# Patient Record
Sex: MALE | Employment: UNEMPLOYED | URBAN - METROPOLITAN AREA
[De-identification: names, ages, dates, MRNs, and addresses within clinical notes are randomized per-mention and may not be internally consistent; named-entity substitution may affect disease eponyms.]

---

## 2017-06-08 ENCOUNTER — PATIENT OUTREACH (OUTPATIENT)
Dept: FAMILY MEDICINE CLINIC | Facility: CLINIC | Age: 22
End: 2017-06-08

## 2017-06-20 ENCOUNTER — OFFICE VISIT (OUTPATIENT)
Dept: FAMILY MEDICINE CLINIC | Facility: CLINIC | Age: 22
End: 2017-06-20

## 2017-06-20 VITALS
RESPIRATION RATE: 12 BRPM | HEIGHT: 70.25 IN | BODY MASS INDEX: 32.78 KG/M2 | TEMPERATURE: 98 F | OXYGEN SATURATION: 99 % | SYSTOLIC BLOOD PRESSURE: 112 MMHG | WEIGHT: 229 LBS | DIASTOLIC BLOOD PRESSURE: 68 MMHG | HEART RATE: 70 BPM

## 2017-06-20 DIAGNOSIS — Z00.00 ROUTINE ADULT HEALTH MAINTENANCE: Primary | ICD-10-CM

## 2017-06-20 PROCEDURE — 99395 PREV VISIT EST AGE 18-39: CPT | Performed by: FAMILY MEDICINE

## 2017-06-20 NOTE — PROGRESS NOTES
Jose Singer is a 22year old male who presents for a complete physical exam.   HPI:   Pt complains of nothing.   Urination changes no  ED symptoms no  Immunizations needed no  Wt Readings from Last 6 Encounters:  06/20/17 : 229 lb  10/24/16 : 255 lb  03/0 allergic rhinitis/asthma    EXAM:   /68 mmHg  Pulse 70  Temp(Src) 98.1 °F (36.7 °C) (Oral)  Resp 12  Ht 70.25\"  Wt 229 lb  BMI 32.64 kg/m2  SpO2 99%  Body mass index is 32.64 kg/(m^2).    GENERAL: NAD, pleasant, well developed, normal voice  SKIN: no or prostate cancer, patient instructed to get colon cancer screening and prostate cancer screening done which were discussed in detail. Pt educated on immunizations appropriate for age.      The patient verbalizes understanding of these recommendations

## 2017-07-20 NOTE — ADDENDUM NOTE
Encounter addended by: Maddi Castrejon on: 7/20/2017 10:57 AM<BR>    Actions taken: Letter status changed

## 2017-11-06 ENCOUNTER — OFFICE VISIT (OUTPATIENT)
Dept: FAMILY MEDICINE CLINIC | Facility: CLINIC | Age: 22
End: 2017-11-06

## 2017-11-06 ENCOUNTER — HOSPITAL ENCOUNTER (OUTPATIENT)
Dept: GENERAL RADIOLOGY | Age: 22
Discharge: HOME | End: 2017-11-06
Attending: FAMILY MEDICINE
Payer: COMMERCIAL

## 2017-11-06 VITALS
OXYGEN SATURATION: 100 % | RESPIRATION RATE: 12 BRPM | DIASTOLIC BLOOD PRESSURE: 62 MMHG | SYSTOLIC BLOOD PRESSURE: 114 MMHG | WEIGHT: 226 LBS | HEIGHT: 71 IN | BODY MASS INDEX: 31.64 KG/M2 | TEMPERATURE: 98 F | HEART RATE: 60 BPM

## 2017-11-06 DIAGNOSIS — M25.511 RIGHT SHOULDER PAIN, UNSPECIFIED CHRONICITY: ICD-10-CM

## 2017-11-06 DIAGNOSIS — M54.50 BILATERAL LOW BACK PAIN WITHOUT SCIATICA, UNSPECIFIED CHRONICITY: ICD-10-CM

## 2017-11-06 DIAGNOSIS — M54.50 BILATERAL LOW BACK PAIN WITHOUT SCIATICA, UNSPECIFIED CHRONICITY: Primary | ICD-10-CM

## 2017-11-06 PROCEDURE — 72110 X-RAY EXAM L-2 SPINE 4/>VWS: CPT | Performed by: FAMILY MEDICINE

## 2017-11-06 PROCEDURE — 99214 OFFICE O/P EST MOD 30 MIN: CPT | Performed by: FAMILY MEDICINE

## 2017-11-06 NOTE — PROGRESS NOTES
HPI:    Patient ID: Yuni Ramsey is a 25year old male. HPI  Patient is here with complaint of right shoulder pain after he was lifting up 1 of his friends and carry him downstairs because he was drunk.   The next day he felt pain in the right shoulder and stretching as needed. I would do the exercise for 1 week and follow-up with me in 7-10 days. Hold off on gym workout at this time. He does do resistance exercise in the gym. No orders of the defined types were placed in this encounter.       Meds Th

## 2017-11-07 ENCOUNTER — TELEPHONE (OUTPATIENT)
Dept: FAMILY MEDICINE CLINIC | Facility: CLINIC | Age: 22
End: 2017-11-07

## 2017-11-07 NOTE — TELEPHONE ENCOUNTER
Pt called back. Spoke with patient regarding xray results in detail.  Pt scheduled a follow up appt with dr. Reynaldo Lin  Date Time Provider Tamia Yolanda   11/14/2017 4:45 PM Kassidy Yanes MD [facility] [address]

## 2017-11-14 ENCOUNTER — OFFICE VISIT (OUTPATIENT)
Dept: FAMILY MEDICINE CLINIC | Facility: CLINIC | Age: 22
End: 2017-11-14

## 2017-11-14 VITALS
HEIGHT: 71 IN | HEART RATE: 61 BPM | RESPIRATION RATE: 12 BRPM | TEMPERATURE: 98 F | SYSTOLIC BLOOD PRESSURE: 118 MMHG | BODY MASS INDEX: 31.64 KG/M2 | OXYGEN SATURATION: 100 % | WEIGHT: 226 LBS | DIASTOLIC BLOOD PRESSURE: 78 MMHG

## 2017-11-14 DIAGNOSIS — M54.50 BILATERAL LOW BACK PAIN WITHOUT SCIATICA, UNSPECIFIED CHRONICITY: Primary | ICD-10-CM

## 2017-11-14 DIAGNOSIS — M25.511 RIGHT SHOULDER PAIN, UNSPECIFIED CHRONICITY: ICD-10-CM

## 2017-11-14 PROCEDURE — 99214 OFFICE O/P EST MOD 30 MIN: CPT | Performed by: FAMILY MEDICINE

## 2017-11-14 RX ORDER — PREDNISONE 20 MG/1
20 TABLET ORAL 2 TIMES DAILY
Qty: 10 TABLET | Refills: 0 | Status: SHIPPED | OUTPATIENT
Start: 2017-11-14 | End: 2017-11-19

## 2017-11-16 NOTE — PROGRESS NOTES
HPI:    Patient ID: Handy Ruffin is a 25year old male. HPI  Patient is here for follow-up of low back pain and shoulder pain. The low back pain is gone. Shoulder pain persists.   Review of Systems  Negative except for the above       Current Outpa

## 2017-12-12 ENCOUNTER — OFFICE VISIT (OUTPATIENT)
Dept: FAMILY MEDICINE CLINIC | Facility: CLINIC | Age: 22
End: 2017-12-12

## 2017-12-12 VITALS
RESPIRATION RATE: 12 BRPM | DIASTOLIC BLOOD PRESSURE: 68 MMHG | SYSTOLIC BLOOD PRESSURE: 118 MMHG | HEART RATE: 55 BPM | TEMPERATURE: 98 F | BODY MASS INDEX: 32 KG/M2 | WEIGHT: 229 LBS | OXYGEN SATURATION: 100 %

## 2017-12-12 DIAGNOSIS — M54.50 BILATERAL LOW BACK PAIN WITHOUT SCIATICA, UNSPECIFIED CHRONICITY: Primary | ICD-10-CM

## 2017-12-12 PROCEDURE — 99214 OFFICE O/P EST MOD 30 MIN: CPT | Performed by: FAMILY MEDICINE

## 2017-12-12 RX ORDER — PREDNISONE 20 MG/1
20 TABLET ORAL 2 TIMES DAILY
Qty: 10 TABLET | Refills: 0 | Status: SHIPPED | OUTPATIENT
Start: 2017-12-12 | End: 2017-12-17

## 2017-12-12 NOTE — PROGRESS NOTES
HPI:    Patient ID: Soco Gordon is a 25year old male. HPI  Patient is here with complaint of low back pain over the past several weeks or more. No radiation no bowel or bladder symptoms. Over the last several days it has gotten somewhat worse.

## 2017-12-18 ENCOUNTER — APPOINTMENT (OUTPATIENT)
Dept: PHYSICAL THERAPY | Facility: HOSPITAL | Age: 22
End: 2017-12-18
Attending: FAMILY MEDICINE
Payer: COMMERCIAL

## 2017-12-21 ENCOUNTER — HOSPITAL ENCOUNTER (OUTPATIENT)
Dept: PHYSICAL THERAPY | Facility: HOSPITAL | Age: 22
Setting detail: THERAPIES SERIES
Discharge: HOME | End: 2017-12-21
Attending: FAMILY MEDICINE
Payer: COMMERCIAL

## 2017-12-21 PROCEDURE — 97161 PT EVAL LOW COMPLEX 20 MIN: CPT

## 2017-12-28 ENCOUNTER — APPOINTMENT (OUTPATIENT)
Dept: PHYSICAL THERAPY | Facility: HOSPITAL | Age: 22
End: 2017-12-28
Attending: FAMILY MEDICINE
Payer: COMMERCIAL

## 2018-01-04 ENCOUNTER — APPOINTMENT (OUTPATIENT)
Dept: PHYSICAL THERAPY | Facility: HOSPITAL | Age: 23
End: 2018-01-04
Attending: FAMILY MEDICINE
Payer: COMMERCIAL

## 2018-01-08 ENCOUNTER — TELEPHONE (OUTPATIENT)
Dept: FAMILY MEDICINE CLINIC | Facility: CLINIC | Age: 23
End: 2018-01-08

## 2018-01-08 ENCOUNTER — APPOINTMENT (OUTPATIENT)
Dept: PHYSICAL THERAPY | Facility: HOSPITAL | Age: 23
End: 2018-01-08
Attending: FAMILY MEDICINE
Payer: COMMERCIAL

## 2018-01-08 DIAGNOSIS — M54.50 BILATERAL LOW BACK PAIN WITHOUT SCIATICA, UNSPECIFIED CHRONICITY: Primary | ICD-10-CM

## 2018-01-08 NOTE — TELEPHONE ENCOUNTER
Fredy Nichols  [facility]   Cc: [facility] Referral Pool   Phone Number: [phone]             .Reason for the order/referral:Back Pain   PCP: Michelle Medina   Refer to Provider: Tobin Cooper   Specialty:Physical Therapy   Patient Insurance: Pa

## 2018-01-15 ENCOUNTER — APPOINTMENT (OUTPATIENT)
Dept: PHYSICAL THERAPY | Facility: HOSPITAL | Age: 23
End: 2018-01-15
Attending: FAMILY MEDICINE
Payer: COMMERCIAL

## 2018-06-22 ENCOUNTER — OFFICE VISIT (OUTPATIENT)
Dept: FAMILY MEDICINE CLINIC | Facility: CLINIC | Age: 23
End: 2018-06-22

## 2018-06-22 VITALS
WEIGHT: 242 LBS | BODY MASS INDEX: 34 KG/M2 | OXYGEN SATURATION: 100 % | HEART RATE: 75 BPM | RESPIRATION RATE: 12 BRPM | TEMPERATURE: 98 F

## 2018-06-22 DIAGNOSIS — M25.562 ACUTE PAIN OF LEFT KNEE: Primary | ICD-10-CM

## 2018-06-22 DIAGNOSIS — Z77.21 EXPOSURE TO POTENTIALLY HAZARDOUS BODY FLUIDS: ICD-10-CM

## 2018-06-22 DIAGNOSIS — Z00.00 ROUTINE ADULT HEALTH MAINTENANCE: ICD-10-CM

## 2018-06-22 PROBLEM — M54.50 BILATERAL LOW BACK PAIN WITHOUT SCIATICA: Status: RESOLVED | Noted: 2017-11-06 | Resolved: 2018-06-22

## 2018-06-22 PROBLEM — M25.511 RIGHT SHOULDER PAIN: Status: RESOLVED | Noted: 2017-11-06 | Resolved: 2018-06-22

## 2018-06-22 PROCEDURE — 99213 OFFICE O/P EST LOW 20 MIN: CPT | Performed by: FAMILY MEDICINE

## 2018-06-22 RX ORDER — PREDNISONE 20 MG/1
20 TABLET ORAL 2 TIMES DAILY
Qty: 10 TABLET | Refills: 0 | Status: SHIPPED | OUTPATIENT
Start: 2018-06-22 | End: 2018-06-27

## 2018-06-22 NOTE — PROGRESS NOTES
HPI:    Patient ID: Nick Hill is a 25year old male. HPI  6-8 wks, left knee pain, plays volleyball . Not recently too much.   Patient also states she had unprotected intercourse yesterday with a girl and she got checked for STDs and was told sh

## 2018-06-28 ENCOUNTER — LAB ENCOUNTER (OUTPATIENT)
Dept: LAB | Age: 23
End: 2018-06-28
Attending: FAMILY MEDICINE
Payer: COMMERCIAL

## 2018-06-28 DIAGNOSIS — Z77.21 EXPOSURE TO POTENTIALLY HAZARDOUS BODY FLUIDS: ICD-10-CM

## 2018-06-28 DIAGNOSIS — Z00.00 ROUTINE ADULT HEALTH MAINTENANCE: ICD-10-CM

## 2018-06-28 PROCEDURE — 85027 COMPLETE CBC AUTOMATED: CPT | Performed by: FAMILY MEDICINE

## 2018-06-28 PROCEDURE — 80053 COMPREHEN METABOLIC PANEL: CPT | Performed by: FAMILY MEDICINE

## 2018-06-28 PROCEDURE — 87389 HIV-1 AG W/HIV-1&-2 AB AG IA: CPT | Performed by: FAMILY MEDICINE

## 2018-06-28 PROCEDURE — 87591 N.GONORRHOEAE DNA AMP PROB: CPT | Performed by: FAMILY MEDICINE

## 2018-06-28 PROCEDURE — 86694 HERPES SIMPLEX NES ANTBDY: CPT | Performed by: FAMILY MEDICINE

## 2018-06-28 PROCEDURE — 36415 COLL VENOUS BLD VENIPUNCTURE: CPT | Performed by: FAMILY MEDICINE

## 2018-06-28 PROCEDURE — 87491 CHLMYD TRACH DNA AMP PROBE: CPT | Performed by: FAMILY MEDICINE

## 2018-06-28 PROCEDURE — 80061 LIPID PANEL: CPT | Performed by: FAMILY MEDICINE

## 2018-06-28 PROCEDURE — 86592 SYPHILIS TEST NON-TREP QUAL: CPT | Performed by: FAMILY MEDICINE

## 2018-07-01 LAB — RPR SER QL: NONREACTIVE

## 2019-11-13 ENCOUNTER — OFFICE VISIT (OUTPATIENT)
Dept: FAMILY MEDICINE CLINIC | Facility: CLINIC | Age: 24
End: 2019-11-13
Payer: COMMERCIAL

## 2019-11-13 ENCOUNTER — LAB ENCOUNTER (OUTPATIENT)
Dept: LAB | Age: 24
End: 2019-11-13
Attending: FAMILY MEDICINE
Payer: COMMERCIAL

## 2019-11-13 VITALS
WEIGHT: 216 LBS | OXYGEN SATURATION: 98 % | BODY MASS INDEX: 30.24 KG/M2 | HEIGHT: 71 IN | RESPIRATION RATE: 16 BRPM | SYSTOLIC BLOOD PRESSURE: 118 MMHG | TEMPERATURE: 99 F | HEART RATE: 69 BPM | DIASTOLIC BLOOD PRESSURE: 80 MMHG

## 2019-11-13 DIAGNOSIS — Z00.00 ROUTINE GENERAL MEDICAL EXAMINATION AT A HEALTH CARE FACILITY: ICD-10-CM

## 2019-11-13 DIAGNOSIS — Z77.21 EXPOSURE TO POTENTIALLY HAZARDOUS BODY FLUIDS: ICD-10-CM

## 2019-11-13 DIAGNOSIS — Z00.00 ROUTINE GENERAL MEDICAL EXAMINATION AT A HEALTH CARE FACILITY: Primary | ICD-10-CM

## 2019-11-13 PROCEDURE — 84439 ASSAY OF FREE THYROXINE: CPT

## 2019-11-13 PROCEDURE — 90686 IIV4 VACC NO PRSV 0.5 ML IM: CPT | Performed by: FAMILY MEDICINE

## 2019-11-13 PROCEDURE — 87591 N.GONORRHOEAE DNA AMP PROB: CPT

## 2019-11-13 PROCEDURE — 99395 PREV VISIT EST AGE 18-39: CPT | Performed by: FAMILY MEDICINE

## 2019-11-13 PROCEDURE — 86694 HERPES SIMPLEX NES ANTBDY: CPT

## 2019-11-13 PROCEDURE — 36415 COLL VENOUS BLD VENIPUNCTURE: CPT

## 2019-11-13 PROCEDURE — 80053 COMPREHEN METABOLIC PANEL: CPT

## 2019-11-13 PROCEDURE — 86592 SYPHILIS TEST NON-TREP QUAL: CPT

## 2019-11-13 PROCEDURE — 84443 ASSAY THYROID STIM HORMONE: CPT

## 2019-11-13 PROCEDURE — 87491 CHLMYD TRACH DNA AMP PROBE: CPT

## 2019-11-13 PROCEDURE — 90471 IMMUNIZATION ADMIN: CPT | Performed by: FAMILY MEDICINE

## 2019-11-13 PROCEDURE — 87389 HIV-1 AG W/HIV-1&-2 AB AG IA: CPT

## 2019-11-13 PROCEDURE — 85025 COMPLETE CBC W/AUTO DIFF WBC: CPT

## 2019-11-13 PROCEDURE — 80061 LIPID PANEL: CPT

## 2019-11-17 NOTE — PROGRESS NOTES
Keyonna Alvares is a 22year old male who presents for a complete physical exam.   HPI:   Pt complains of nothing.   Urination changes no  ED symptoms no  Immunizations needed no  Wt Readings from Last 6 Encounters:  11/13/19 : 216 lb (98 kg)  06/22/18 : W/CONF   Result Value Ref Range    Rapid Plasma Reagin Nonreactive Nonreactive   CHLAMYDIA/GONOCOCCUS, KATARINA   Result Value Ref Range    Chlamydia Trachomatis Amplified RNA Negative Negative    Neisseria Gonorrhoeae Amplified RNA Negative Negative    Chlam/G GENERAL: NAD, pleasant, well developed, normal voice  SKIN: no rashes, no suspicious moles or lesions  HENT: NCAT, EACs clear b/l, TMs normal b/l, nasal turbinatess normal b/l, oropharynx with mmm/clear/normal, gingiva normal, lips normal  EYES: PERRLA, if family history of colon or prostate cancer, patient instructed to get colon cancer screening and prostate cancer screening done which were discussed in detail. Pt educated on immunizations appropriate for age.      The patient verbalizes understandin

## 2019-11-19 ENCOUNTER — OFFICE VISIT (OUTPATIENT)
Dept: FAMILY MEDICINE CLINIC | Facility: CLINIC | Age: 24
End: 2019-11-19
Payer: COMMERCIAL

## 2019-11-19 VITALS
BODY MASS INDEX: 31.31 KG/M2 | WEIGHT: 223.63 LBS | DIASTOLIC BLOOD PRESSURE: 70 MMHG | RESPIRATION RATE: 16 BRPM | HEIGHT: 71 IN | HEART RATE: 80 BPM | SYSTOLIC BLOOD PRESSURE: 107 MMHG | TEMPERATURE: 98 F

## 2019-11-19 DIAGNOSIS — A74.9 CHLAMYDIA: Primary | ICD-10-CM

## 2019-11-19 PROCEDURE — 99213 OFFICE O/P EST LOW 20 MIN: CPT | Performed by: FAMILY MEDICINE

## 2019-11-19 RX ORDER — AZITHROMYCIN 250 MG/1
250 TABLET, FILM COATED ORAL DAILY
Qty: 4 TABLET | Refills: 0 | Status: SHIPPED | OUTPATIENT
Start: 2019-11-19 | End: 2019-11-23

## 2019-11-19 NOTE — PROGRESS NOTES
HPI:   Macho Cabello is a 22year old male that presents for 11/13/19 lab results. The patient states he has had unprotected intercourse. He has never had chlamydia in the past. He is asymptomatic.  He was informed by a sexual partner he last had in cyanosis, 2+ radial pulses b/l. NEURO:  Grossly normal     ASSESSMENT AND PLAN:      Michelle was seen today for lab results.     Diagnoses and all orders for this visit:    Chlamydia  I counseled patient on chlamydia treatment and advise him on safe-sex prac

## 2019-11-20 ENCOUNTER — TELEPHONE (OUTPATIENT)
Dept: FAMILY MEDICINE CLINIC | Facility: CLINIC | Age: 24
End: 2019-11-20

## 2019-11-20 NOTE — TELEPHONE ENCOUNTER
Spoke to pharmacy, need clarification on Azithromycin script.  Rx sent in with 2 different directions \"take 1 tablet by mouth for 4 doses\" and \"take four tablets x 1 dose\"    Last OV 11/19/19  Chlamydia  I counseled patient on chlamydia treatment and ad

## 2019-11-20 NOTE — TELEPHONE ENCOUNTER
azithromycin 250 MG Oral Tab    There are 2 different directions. Please clarify for pharmacy.     [facility] [address], [phone], [phone]

## 2019-12-09 ENCOUNTER — TELEPHONE (OUTPATIENT)
Dept: FAMILY MEDICINE CLINIC | Facility: CLINIC | Age: 24
End: 2019-12-09

## 2019-12-09 NOTE — TELEPHONE ENCOUNTER
Calling to find out if patient was prescribed medication for his condition. Informed her that medication was prescribed and the date it was prescribed.

## 2020-08-11 NOTE — PROGRESS NOTES
- schedule zofran at his request  - will decrease to prn today  - c/w compazine and ativan as prns   SPINE EVALUATION:   Referring Physician: Dr. Herb Arevalo  Diagnosis: BL low back pain without sciatica     Date of Service: 12/21/2017     PATIENT SUMMARY   Toy Ramirez III is a 25year old y/o male who presents to therapy today with complaints of low franki pain at 40, to 20 cm ++pain (comparable sign)  Extension: 63 cm  Sidebending: R WF +painL; L WFL  Rotation: R WFL +pain; L WFL    Accessory motion: unremarkable  Palpation: non-tender to palpation    Strength:   LE   Hip flexion: R 5/5; L 5/5  Hip abductio PT    [de-identified] certification required: Yes  I certify the need for these services furnished under this plan of treatment and while under my care.     X___________________________________________________ Date____________________    Certification From: 80/

## 2021-10-18 ENCOUNTER — OFFICE VISIT (OUTPATIENT)
Dept: FAMILY MEDICINE CLINIC | Facility: CLINIC | Age: 26
End: 2021-10-18
Payer: COMMERCIAL

## 2021-10-18 VITALS
BODY MASS INDEX: 30.8 KG/M2 | TEMPERATURE: 97 F | HEART RATE: 88 BPM | RESPIRATION RATE: 16 BRPM | SYSTOLIC BLOOD PRESSURE: 118 MMHG | DIASTOLIC BLOOD PRESSURE: 70 MMHG | OXYGEN SATURATION: 99 % | WEIGHT: 220 LBS | HEIGHT: 71 IN

## 2021-10-18 DIAGNOSIS — Z00.00 ROUTINE ADULT HEALTH MAINTENANCE: Primary | ICD-10-CM

## 2021-10-18 DIAGNOSIS — Z11.3 SCREENING EXAMINATION FOR STD (SEXUALLY TRANSMITTED DISEASE): ICD-10-CM

## 2021-10-18 DIAGNOSIS — Z77.21 EXPOSURE TO POTENTIALLY HAZARDOUS BODY FLUIDS: ICD-10-CM

## 2021-10-18 PROCEDURE — 3078F DIAST BP <80 MM HG: CPT | Performed by: FAMILY MEDICINE

## 2021-10-18 PROCEDURE — 3074F SYST BP LT 130 MM HG: CPT | Performed by: FAMILY MEDICINE

## 2021-10-18 PROCEDURE — 3008F BODY MASS INDEX DOCD: CPT | Performed by: FAMILY MEDICINE

## 2021-10-18 PROCEDURE — 99395 PREV VISIT EST AGE 18-39: CPT | Performed by: FAMILY MEDICINE

## 2021-10-18 NOTE — PROGRESS NOTES
Radha Joseph is a 23year old male who presents for a complete physical exam.   HPI:   Pt complains of nothing.   Urination changes no  ED symptoms no  Immunizations needed no  Wt Readings from Last 6 Encounters:  10/18/21 : 220 lb (99.8 kg)  11/19/19 Non-Reactive Non-Reactive   HIV AG AB COMBO LAVENDER HOLD   Result Value Ref Range    HIV Ag Ab Hold Lavender Auto Resulted    CHLAMYDIA/GONOCOCCUS, KATARINA    Specimen: Urine;  Other   Result Value Ref Range    Chlamydia Trachomatis Amplified RNA Positive (A) respiratory symptoms  LUNGS: denies EVERARDO, wheezing, cough, orthopnea and PND  CARDIOVASCULAR: denies CP, palpitations, rapid or slow heart rate.  Denies DENISE/lower extremity swelling  GI: denies abdominal pain,denies heartburn, denies n/v/c/d/change in stools judgement and insight      Immunization History  Administered            Date(s) Administered    Covid-19 Vaccine Pfizer 30 mcg/0.3 ml                          04/05/2021 05/03/2021      FLULAVAL 6 months & older 0.5 ml Prefilled syringe ([id number])

## 2021-10-19 ENCOUNTER — LAB ENCOUNTER (OUTPATIENT)
Dept: LAB | Age: 26
End: 2021-10-19
Attending: FAMILY MEDICINE
Payer: COMMERCIAL

## 2021-10-19 DIAGNOSIS — Z00.00 ROUTINE ADULT HEALTH MAINTENANCE: ICD-10-CM

## 2021-10-19 DIAGNOSIS — Z11.3 SCREENING EXAMINATION FOR STD (SEXUALLY TRANSMITTED DISEASE): ICD-10-CM

## 2021-10-19 DIAGNOSIS — Z11.3 SCREENING EXAMINATION FOR VENEREAL DISEASE: Primary | ICD-10-CM

## 2021-10-19 PROCEDURE — 80050 GENERAL HEALTH PANEL: CPT | Performed by: FAMILY MEDICINE

## 2021-10-19 PROCEDURE — 84439 ASSAY OF FREE THYROXINE: CPT | Performed by: FAMILY MEDICINE

## 2021-10-19 PROCEDURE — 87591 N.GONORRHOEAE DNA AMP PROB: CPT | Performed by: FAMILY MEDICINE

## 2021-10-19 PROCEDURE — 86592 SYPHILIS TEST NON-TREP QUAL: CPT | Performed by: FAMILY MEDICINE

## 2021-10-19 PROCEDURE — 80061 LIPID PANEL: CPT | Performed by: FAMILY MEDICINE

## 2021-10-19 PROCEDURE — 87389 HIV-1 AG W/HIV-1&-2 AB AG IA: CPT | Performed by: FAMILY MEDICINE

## 2021-10-19 PROCEDURE — 87491 CHLMYD TRACH DNA AMP PROBE: CPT | Performed by: FAMILY MEDICINE

## 2021-10-19 PROCEDURE — 87529 HSV DNA AMP PROBE: CPT | Performed by: FAMILY MEDICINE

## 2021-10-25 ENCOUNTER — TELEPHONE (OUTPATIENT)
Dept: FAMILY MEDICINE CLINIC | Facility: CLINIC | Age: 26
End: 2021-10-25

## 2021-10-25 RX ORDER — AMOXICILLIN AND CLAVULANATE POTASSIUM 500; 125 MG/1; MG/1
1 TABLET, FILM COATED ORAL 2 TIMES DAILY
Qty: 20 TABLET | Refills: 0 | Status: SHIPPED | OUTPATIENT
Start: 2021-10-25 | End: 2021-11-04

## 2021-10-25 NOTE — TELEPHONE ENCOUNTER
Spoke with pt, states he was in the office on 10/18/21 and Dr. Delilah Galo stated his tonsils were somewhat swollen and would send in antibiotic. Pt states they seem to have gone down a little bit but does have a mild sore throat.  Were you going to send in anti

## 2021-10-25 NOTE — TELEPHONE ENCOUNTER
Patient calling, patient had visit on 10-18 looking for prescription to be sent to pharmacy.  Patient was to get antibiotic, please advise

## 2021-11-17 ENCOUNTER — VIRTUAL PHONE E/M (OUTPATIENT)
Dept: FAMILY MEDICINE CLINIC | Facility: CLINIC | Age: 26
End: 2021-11-17
Payer: COMMERCIAL

## 2021-11-17 DIAGNOSIS — J01.00 ACUTE NON-RECURRENT MAXILLARY SINUSITIS: Primary | ICD-10-CM

## 2021-11-17 DIAGNOSIS — J06.9 URI WITH COUGH AND CONGESTION: ICD-10-CM

## 2021-11-17 PROCEDURE — 99213 OFFICE O/P EST LOW 20 MIN: CPT | Performed by: FAMILY MEDICINE

## 2021-11-17 RX ORDER — AMOXICILLIN AND CLAVULANATE POTASSIUM 500; 125 MG/1; MG/1
1 TABLET, FILM COATED ORAL 2 TIMES DAILY
Qty: 20 TABLET | Refills: 0 | Status: SHIPPED | OUTPATIENT
Start: 2021-11-17 | End: 2021-11-27

## 2021-11-17 NOTE — PROGRESS NOTES
TELEMEDICINE VISIT by phone  This visit is conducted using Telemedicine with live, interactive video    Verification of patient identity was established by the  patient (s)  Dacia Morse verbally consents to a telemedicine Weight as of 10/18/21: 220 lb (99.8 kg). No Vital Signs due to telemedicine visit  Physical Exam:  GEN:  Patient is alert, awake and oriented, no apparent distress. ASSESSMENT AND PLAN:      1. Acute non-recurrent maxillary sinusitis    2.  URI with

## 2022-02-21 ENCOUNTER — HOSPITAL ENCOUNTER (OUTPATIENT)
Dept: GENERAL RADIOLOGY | Age: 27
Discharge: HOME | End: 2022-02-21
Attending: FAMILY MEDICINE
Payer: COMMERCIAL

## 2022-02-21 ENCOUNTER — OFFICE VISIT (OUTPATIENT)
Dept: FAMILY MEDICINE CLINIC | Facility: CLINIC | Age: 27
End: 2022-02-21
Payer: COMMERCIAL

## 2022-02-21 VITALS
BODY MASS INDEX: 31.24 KG/M2 | WEIGHT: 223.13 LBS | TEMPERATURE: 98 F | HEIGHT: 71 IN | OXYGEN SATURATION: 98 % | RESPIRATION RATE: 16 BRPM | SYSTOLIC BLOOD PRESSURE: 110 MMHG | HEART RATE: 56 BPM | DIASTOLIC BLOOD PRESSURE: 70 MMHG

## 2022-02-21 DIAGNOSIS — R10.84 GENERALIZED ABDOMINAL PAIN: ICD-10-CM

## 2022-02-21 DIAGNOSIS — R10.13 DYSPEPSIA: ICD-10-CM

## 2022-02-21 DIAGNOSIS — R10.84 GENERALIZED ABDOMINAL PAIN: Primary | ICD-10-CM

## 2022-02-21 DIAGNOSIS — R14.0 BLOATING: ICD-10-CM

## 2022-02-21 PROBLEM — Z77.21 EXPOSURE TO POTENTIALLY HAZARDOUS BODY FLUIDS: Status: RESOLVED | Noted: 2018-06-22 | Resolved: 2022-02-21

## 2022-02-21 PROBLEM — M25.562 ACUTE PAIN OF LEFT KNEE: Status: RESOLVED | Noted: 2018-06-22 | Resolved: 2022-02-21

## 2022-02-21 PROCEDURE — 3078F DIAST BP <80 MM HG: CPT | Performed by: FAMILY MEDICINE

## 2022-02-21 PROCEDURE — 74019 RADEX ABDOMEN 2 VIEWS: CPT | Performed by: FAMILY MEDICINE

## 2022-02-21 PROCEDURE — 3074F SYST BP LT 130 MM HG: CPT | Performed by: FAMILY MEDICINE

## 2022-02-21 PROCEDURE — 3008F BODY MASS INDEX DOCD: CPT | Performed by: FAMILY MEDICINE

## 2022-02-21 PROCEDURE — 99214 OFFICE O/P EST MOD 30 MIN: CPT | Performed by: FAMILY MEDICINE

## 2022-04-27 ENCOUNTER — VIRTUAL PHONE E/M (OUTPATIENT)
Dept: FAMILY MEDICINE CLINIC | Facility: CLINIC | Age: 27
End: 2022-04-27
Payer: COMMERCIAL

## 2022-04-27 DIAGNOSIS — S79.922A INJURY OF LEFT THIGH, INITIAL ENCOUNTER: Primary | ICD-10-CM

## 2022-04-27 PROCEDURE — 99443 PHONE E/M BY PHYS 21-30 MIN: CPT | Performed by: FAMILY MEDICINE

## 2022-09-12 ENCOUNTER — OFFICE VISIT (OUTPATIENT)
Dept: FAMILY MEDICINE CLINIC | Facility: CLINIC | Age: 27
End: 2022-09-12

## 2022-09-12 VITALS
RESPIRATION RATE: 16 BRPM | WEIGHT: 230 LBS | TEMPERATURE: 97 F | DIASTOLIC BLOOD PRESSURE: 70 MMHG | BODY MASS INDEX: 32.2 KG/M2 | HEART RATE: 56 BPM | OXYGEN SATURATION: 98 % | HEIGHT: 71 IN | SYSTOLIC BLOOD PRESSURE: 126 MMHG

## 2022-09-12 DIAGNOSIS — H93.232 HYPERACUSIS OF LEFT EAR: Primary | ICD-10-CM

## 2022-09-12 DIAGNOSIS — H66.90 ACUTE OTITIS MEDIA, UNSPECIFIED OTITIS MEDIA TYPE: ICD-10-CM

## 2022-09-12 PROCEDURE — 99213 OFFICE O/P EST LOW 20 MIN: CPT | Performed by: FAMILY MEDICINE

## 2022-09-12 RX ORDER — AZITHROMYCIN 250 MG/1
TABLET, FILM COATED ORAL
Qty: 6 TABLET | Refills: 0 | Status: SHIPPED | OUTPATIENT
Start: 2022-09-12 | End: 2022-09-17

## 2024-10-01 ENCOUNTER — MED REC SCAN ONLY (OUTPATIENT)
Dept: FAMILY MEDICINE CLINIC | Facility: CLINIC | Age: 29
End: 2024-10-01

## 2025-07-23 ENCOUNTER — OFFICE VISIT (OUTPATIENT)
Dept: FAMILY MEDICINE CLINIC | Facility: CLINIC | Age: 30
End: 2025-07-23
Payer: COMMERCIAL

## 2025-07-23 ENCOUNTER — LAB ENCOUNTER (OUTPATIENT)
Dept: LAB | Age: 30
End: 2025-07-23
Attending: FAMILY MEDICINE
Payer: COMMERCIAL

## 2025-07-23 VITALS
TEMPERATURE: 98 F | HEART RATE: 74 BPM | SYSTOLIC BLOOD PRESSURE: 114 MMHG | HEIGHT: 71 IN | BODY MASS INDEX: 31.92 KG/M2 | DIASTOLIC BLOOD PRESSURE: 70 MMHG | OXYGEN SATURATION: 98 % | WEIGHT: 228 LBS | RESPIRATION RATE: 16 BRPM

## 2025-07-23 DIAGNOSIS — K21.9 GASTROESOPHAGEAL REFLUX DISEASE, UNSPECIFIED WHETHER ESOPHAGITIS PRESENT: ICD-10-CM

## 2025-07-23 DIAGNOSIS — Z00.00 ROUTINE ADULT HEALTH MAINTENANCE: Primary | ICD-10-CM

## 2025-07-23 DIAGNOSIS — Z80.0 FAMILY HISTORY- STOMACH CANCER: ICD-10-CM

## 2025-07-23 DIAGNOSIS — M25.562 ACUTE PAIN OF LEFT KNEE: ICD-10-CM

## 2025-07-23 DIAGNOSIS — Z00.00 LABORATORY EXAM ORDERED AS PART OF ROUTINE GENERAL MEDICAL EXAMINATION: ICD-10-CM

## 2025-07-23 DIAGNOSIS — Z23 NEED FOR VACCINATION: ICD-10-CM

## 2025-07-23 LAB
ALBUMIN SERPL-MCNC: 4.5 G/DL (ref 3.2–4.8)
ALBUMIN/GLOB SERPL: 1.7 {RATIO} (ref 1–2)
ALP LIVER SERPL-CCNC: 76 U/L (ref 45–117)
ALT SERPL-CCNC: 28 U/L (ref 10–49)
ANION GAP SERPL CALC-SCNC: 2 MMOL/L (ref 0–18)
AST SERPL-CCNC: 33 U/L (ref ?–34)
BASOPHILS # BLD AUTO: 0.05 X10(3) UL (ref 0–0.2)
BASOPHILS NFR BLD AUTO: 0.6 %
BILIRUB SERPL-MCNC: 0.7 MG/DL (ref 0.3–1.2)
BUN BLD-MCNC: 11 MG/DL (ref 9–23)
CALCIUM BLD-MCNC: 9.3 MG/DL (ref 8.7–10.6)
CHLORIDE SERPL-SCNC: 109 MMOL/L (ref 98–112)
CHOLEST SERPL-MCNC: 156 MG/DL (ref ?–200)
CO2 SERPL-SCNC: 28 MMOL/L (ref 21–32)
CREAT BLD-MCNC: 1.18 MG/DL (ref 0.7–1.3)
EGFRCR SERPLBLD CKD-EPI 2021: 86 ML/MIN/1.73M2 (ref 60–?)
EOSINOPHIL # BLD AUTO: 0.11 X10(3) UL (ref 0–0.7)
EOSINOPHIL NFR BLD AUTO: 1.2 %
ERYTHROCYTE [DISTWIDTH] IN BLOOD BY AUTOMATED COUNT: 12.4 %
FASTING PATIENT LIPID ANSWER: YES
FASTING STATUS PATIENT QL REPORTED: YES
GLOBULIN PLAS-MCNC: 2.7 G/DL (ref 2–3.5)
GLUCOSE BLD-MCNC: 84 MG/DL (ref 70–99)
HCT VFR BLD AUTO: 44.7 % (ref 39–53)
HDLC SERPL-MCNC: 46 MG/DL (ref 40–59)
HGB BLD-MCNC: 15.5 G/DL (ref 13–17.5)
IMM GRANULOCYTES # BLD AUTO: 0.08 X10(3) UL (ref 0–1)
IMM GRANULOCYTES NFR BLD: 0.9 %
LDLC SERPL CALC-MCNC: 95 MG/DL (ref ?–100)
LYMPHOCYTES # BLD AUTO: 1.79 X10(3) UL (ref 1–4)
LYMPHOCYTES NFR BLD AUTO: 20.3 %
MCH RBC QN AUTO: 29.7 PG (ref 26–34)
MCHC RBC AUTO-ENTMCNC: 34.7 G/DL (ref 31–37)
MCV RBC AUTO: 85.6 FL (ref 80–100)
MONOCYTES # BLD AUTO: 0.83 X10(3) UL (ref 0.1–1)
MONOCYTES NFR BLD AUTO: 9.4 %
NEUTROPHILS # BLD AUTO: 5.96 X10 (3) UL (ref 1.5–7.7)
NEUTROPHILS # BLD AUTO: 5.96 X10(3) UL (ref 1.5–7.7)
NEUTROPHILS NFR BLD AUTO: 67.6 %
NONHDLC SERPL-MCNC: 110 MG/DL (ref ?–130)
OSMOLALITY SERPL CALC.SUM OF ELEC: 287 MOSM/KG (ref 275–295)
PLATELET # BLD AUTO: 251 10(3)UL (ref 150–450)
POTASSIUM SERPL-SCNC: 4.2 MMOL/L (ref 3.5–5.1)
PROT SERPL-MCNC: 7.2 G/DL (ref 5.7–8.2)
RBC # BLD AUTO: 5.22 X10(6)UL (ref 4.3–5.7)
SODIUM SERPL-SCNC: 139 MMOL/L (ref 136–145)
TRIGL SERPL-MCNC: 75 MG/DL (ref 30–149)
VLDLC SERPL CALC-MCNC: 12 MG/DL (ref 0–30)
WBC # BLD AUTO: 8.8 X10(3) UL (ref 4–11)

## 2025-07-23 PROCEDURE — 85025 COMPLETE CBC W/AUTO DIFF WBC: CPT

## 2025-07-23 PROCEDURE — 80053 COMPREHEN METABOLIC PANEL: CPT

## 2025-07-23 PROCEDURE — 80061 LIPID PANEL: CPT

## 2025-07-23 PROCEDURE — 36415 COLL VENOUS BLD VENIPUNCTURE: CPT

## 2025-07-23 NOTE — PROGRESS NOTES
Aunt, uncle, stomach cancer,     Gerd symptoms,   More freq bowel movements.     Left knee pain 2-3 days ago, inner aspect.   Knee sleeve.

## 2025-07-26 NOTE — PROGRESS NOTES
Cesario Jaimes is a 30 year old male who presents for a complete physical exam.   HPI:   Patient has complaint of having some frequent bowel movements lately over the past several months.  He also noticed some increased acidity.  He does states he has family history of an aunt and uncle who had stomach cancer.  He has not had any weight loss or any nausea or vomiting.  Denies any fever or chills.  He also states that about 2 to 3 days ago he had some left-sided knee pain on the inner aspect without trauma.  The pain is off-and-on and mild.      Urination changes no  ED symptoms no  Immunizations needed no  Wt Readings from Last 6 Encounters:   07/23/25 228 lb (103.4 kg)   09/12/22 230 lb (104.3 kg)   02/21/22 223 lb 2 oz (101.2 kg)   10/18/21 220 lb (99.8 kg)   11/19/19 223 lb 9.6 oz (101.4 kg)   11/13/19 216 lb (98 kg)     Body mass index is 31.8 kg/m².     Results for orders placed or performed in visit on 10/19/21   RPR W/Conf    Collection Time: 10/19/21  3:47 PM   Result Value Ref Range    Rapid Plasma Reagin Nonreactive Nonreactive   Lipid Panel    Collection Time: 10/19/21  3:47 PM   Result Value Ref Range    Cholesterol, Total 122 <200 mg/dL    HDL Cholesterol 51 40 - 59 mg/dL    Triglycerides 84 30 - 149 mg/dL    LDL Cholesterol 55 <100 mg/dL    VLDL 12 0 - 30 mg/dL    Non HDL Chol 71 <130 mg/dL    FASTING No    Comp Metabolic Panel (14)    Collection Time: 10/19/21  3:47 PM   Result Value Ref Range    Glucose 92 70 - 99 mg/dL    Sodium 136 136 - 145 mmol/L    Potassium 3.7 3.5 - 5.1 mmol/L    Chloride 104 98 - 112 mmol/L    CO2 28.0 21.0 - 32.0 mmol/L    Anion Gap 4 0 - 18 mmol/L    BUN 13 7 - 18 mg/dL    Creatinine 0.96 0.70 - 1.30 mg/dL    Calcium, Total 8.8 8.5 - 10.1 mg/dL    Calculated Osmolality 282 275 - 295 mOsm/kg    GFR, Non- 109 >=60    GFR, -American 126 >=60    AST 20 15 - 37 U/L    ALT 26 16 - 61 U/L    Alkaline Phosphatase 88 45 - 117 U/L    Bilirubin, Total 0.7 0.1 -  2.0 mg/dL    Total Protein 7.8 6.4 - 8.2 g/dL    Albumin 3.7 3.4 - 5.0 g/dL    Globulin  4.1 2.8 - 4.4 g/dL    A/G Ratio 0.9 (L) 1.0 - 2.0    FASTING No    TSH and Free T4    Collection Time: 10/19/21  3:47 PM   Result Value Ref Range    Free T4 1.2 0.8 - 1.7 ng/dL    TSH 0.481 0.358 - 3.740 mIU/mL   Herpes Simplex 1/2 (No subtyping)    Collection Time: 10/19/21  3:47 PM    Specimen: Blood   Result Value Ref Range    Herpes Simplex Virus by PCR Not Detected     Herpes Simplex Virus Source Blood    HIV Ag/Ab Combo    Collection Time: 10/19/21  3:47 PM   Result Value Ref Range    HIV Antigen Antibody Combo Non-Reactive Non-Reactive   Chlamydia/Gc Amplification    Collection Time: 10/19/21  3:47 PM    Specimen: Urine; Other   Result Value Ref Range    Chlamydia Trachomatis Amplified RNA Negative Negative    Neisseria Gonorrhoeae Amplified RNA Negative Negative    Chlam/GC Source Urine    CBC W/ DIFFERENTIAL    Collection Time: 10/19/21  3:47 PM   Result Value Ref Range    WBC 14.4 (H) 4.0 - 11.0 x10(3) uL    RBC 5.07 4.30 - 5.70 x10(6)uL    HGB 14.8 13.0 - 17.5 g/dL    HCT 43.7 39.0 - 53.0 %    .0 150.0 - 450.0 10(3)uL    MCV 86.2 80.0 - 100.0 fL    MCH 29.2 26.0 - 34.0 pg    MCHC 33.9 31.0 - 37.0 g/dL    RDW 12.0 %    Neutrophil Absolute Prelim 12.11 (H) 1.50 - 7.70 x10 (3) uL    Neutrophil Absolute 12.11 (H) 1.50 - 7.70 x10(3) uL    Lymphocyte Absolute 0.86 (L) 1.00 - 4.00 x10(3) uL    Monocyte Absolute 1.24 (H) 0.10 - 1.00 x10(3) uL    Eosinophil Absolute 0.07 0.00 - 0.70 x10(3) uL    Basophil Absolute 0.05 0.00 - 0.20 x10(3) uL    Immature Granulocyte Absolute 0.11 0.00 - 1.00 x10(3) uL    Neutrophil % 83.8 %    Lymphocyte % 6.0 %    Monocyte % 8.6 %    Eosinophil % 0.5 %    Basophil % 0.3 %    Immature Granulocyte % 0.8 %       Current Medications[1]   Past Medical History[2]   Past Surgical History[3]   Family History[4]   Social History:  Short Social Hx on File[5]      REVIEW OF SYSTEMS:   GENERAL: feels  well overall, denies fever or chills, denies change in weight  SKIN: denies any unusual skin lesions  EYES: denies changes in vision  HENT: denies upper respiratory symptoms  LUNGS: denies EVERARDO, wheezing, cough, orthopnea and PND  CARDIOVASCULAR: denies CP, palpitations, rapid or slow heart rate. Denies DENISE/lower extremity swelling  GI: denies abdominal pain,denies heartburn, denies n/v/c/d/change in stools/blood in stool/black stool/change in appetite  : denies nocturia or changes in urinary stream, denies scrotal mass/abnormal discharge from urethra  MUSCULOSKELETAL: denies joint or muscle aches or pains  NEURO: denies headaches/dizziness  PSYCH: denies depression or anxiety  HEMATOLOGIC: denies easy bruising/bleeding/anemia/blood clot disorders  ENDOCRINE: denies weight gain/weight loss/enlargement of neck glands/polyuria/polydypsia  ALL/ASTHMA: denies allergic rhinitis/asthma    EXAM:   /70   Pulse 74   Temp 97.9 °F (36.6 °C) (Temporal)   Resp 16   Ht 5' 11\" (1.803 m)   Wt 228 lb (103.4 kg)   SpO2 98%   BMI 31.80 kg/m²   Body mass index is 31.8 kg/m².   GENERAL: NAD, pleasant, well developed, normal voice  SKIN: no rashes, no suspicious moles or lesions  HENT: NCAT, EACs clear b/l, TMs normal b/l, nasal turbinates normal b/l, oropharynx with mmm/clear/normal, gingiva normal, lips normal  EYES: PERRLA, EOMI, conjunctiva non-injected and non-icteric  NECK: supple, no adenopathy/thyromegaly/thyroid nodules/masses  CHEST: no chest tenderness  LUNGS: CTA A/P, no wheezes/ronchi/rales/crackles, normal air excursion  CARDIOVASCULAR: RRR, no murmur, no lower extremity edema, pedal and femoral pulses 2+ and symmetric b/l  GI: normoactive BS, non-distended, non-tender to palpation, no HSM/masses/pulsations  MUSCULOSKELETAL: Back with normal AROM, no joint swelling, extremities x 4 with normal strength 5/5 and symmetric and with normal AROM/PROM.   EXTREMITIES: no cyanosis, clubbing or edema  NEURO: A&O x3, CN  II-XII grossly intact. Reflexes: biceps and patellar 2+ b/l and symmetric. Gait is normal.  PSYCH: normal affect, no apparent thought disorder, average judgement and insight    Immunization History   Administered Date(s) Administered    Covid-19 Vaccine Pfizer 30 mcg/0.3 ml 04/05/2021, 05/03/2021    FLULAVAL 6 months & older 0.5 ml Prefilled syringe (25421) 11/13/2019    HEP A,Ped/Adol,(2 Dose) 08/06/2013    Meningococcal-Menveo 2month-55yr 08/06/2013    TDAP 08/06/2013, 07/23/2025   Deferred Date(s) Deferred    Influenza Vaccine Refused 10/18/2021       ASSESSMENT AND PLAN:   Cesario Jaimes is a 30 year old male who presents for a complete physical exam.     Cesario was seen today for physical and immunization/injection.    Diagnoses and all orders for this visit:    Routine adult health maintenance    Laboratory exam ordered as part of routine general medical examination  -     CBC W Differential W Platelet [E]; Future  -     Comp Metabolic Panel (14) [E]; Future  -     Lipid Panel [E]; Future    Gastroesophageal reflux disease, unspecified whether esophagitis present  -     Gastro Referral - In Network    Family history- stomach cancer  -     Gastro Referral - In Network    Need for vaccination  -     Immunization Admin Counseling, 1st Component, 18 years and older  -     Immunization Admin Counseling, Additional Component, 18 years and older  -     TdaP (Boostrix/Adacel) Vaccine (> 7 Y)    Acute pain of left knee       Regarding his left knee pain it is more in the area of the medial collateral ligament and I would recommend ice anti-inflammatory and a knee sleeve and see if this helps.  If it does not get better he may need further testing and/or see orthopedic surgeon.  Restrict activities until pain gets better.    Regarding his abdominal issues I would recommend that he see a GI doctor to get evaluated for possible upper endoscopy and/or lower endoscopy.  Will refer.    Patient agreeable to get Tdap vaccination  risk and benefit discussed.    I will order complete blood work as well.    Pt educated on immunizations and health maintenance appropriate for age.     The patient verbalizes understanding of these recommendations and agrees to the plan.    The patient is asked to return for complete physical yearly.    There are no Patient Instructions on file for this visit.      Procedures    CBC W Differential W Platelet [E]    Comp Metabolic Panel (14) [E]    Lipid Panel [E]    Gastro Referral - In Network                 [1]   No current outpatient medications on file.   [2] History reviewed. No pertinent past medical history.  [3] History reviewed. No pertinent surgical history.  [4]   Family History  Problem Relation Age of Onset    Heart Disease Paternal Grandfather     Stroke Paternal Grandfather     Cancer Other         MATERNAL AUNT-BREAST & PATERNAL AUNT BREAST   [5]   Social History  Socioeconomic History    Marital status: Single   Tobacco Use    Smoking status: Never    Smokeless tobacco: Never   Vaping Use    Vaping status: Never Used   Substance and Sexual Activity    Alcohol use: Yes     Comment: Socially    Drug use: No     Social Drivers of Health     Food Insecurity: No Food Insecurity (7/23/2025)    NCSS - Food Insecurity     Worried About Running Out of Food in the Last Year: No     Ran Out of Food in the Last Year: No   Transportation Needs: No Transportation Needs (7/23/2025)    NCSS - Transportation     Lack of Transportation: No   Housing Stability: Not At Risk (7/23/2025)    NCSS - Housing/Utilities     Has Housing: Yes     Worried About Losing Housing: No     Unable to Get Utilities: No

## (undated) NOTE — LETTER
07/20/17        Doe Cox  [address] [facility] [address]      Dear Deon Figueroa records indicate that you have outstanding lab work and or testing that was ordered for you and has not yet been completed:          CBC, Platelet;  No D

## (undated) NOTE — MR AVS SNAPSHOT
[facility] [address] [place] [address] [address], [address]. \Hospital Has a New Name and Outlook.\""  [address] [id number] [phone]               Thank you for choosing us for your health care visit with Pepper Mays MD.  We are glad to serve you and happy to provide you wit your Zip Code and Date of Birth to complete the sign-up process. If you do not sign up before the expiration date, you must request a new code.     Your unique [employer] Access Code: [email or web address]  Expires: 8/19/2017  5:07 PM    If you have questions, you can c